# Patient Record
Sex: FEMALE | Race: WHITE | NOT HISPANIC OR LATINO
[De-identification: names, ages, dates, MRNs, and addresses within clinical notes are randomized per-mention and may not be internally consistent; named-entity substitution may affect disease eponyms.]

---

## 2018-07-10 ENCOUNTER — FORM ENCOUNTER (OUTPATIENT)
Age: 43
End: 2018-07-10

## 2018-08-02 ENCOUNTER — FORM ENCOUNTER (OUTPATIENT)
Age: 43
End: 2018-08-02

## 2019-03-10 ENCOUNTER — FORM ENCOUNTER (OUTPATIENT)
Age: 44
End: 2019-03-10

## 2019-03-21 ENCOUNTER — FORM ENCOUNTER (OUTPATIENT)
Age: 44
End: 2019-03-21

## 2019-10-24 ENCOUNTER — FORM ENCOUNTER (OUTPATIENT)
Age: 44
End: 2019-10-24

## 2019-11-12 ENCOUNTER — FORM ENCOUNTER (OUTPATIENT)
Age: 44
End: 2019-11-12

## 2020-12-01 ENCOUNTER — FORM ENCOUNTER (OUTPATIENT)
Age: 45
End: 2020-12-01

## 2021-06-02 ENCOUNTER — NON-APPOINTMENT (OUTPATIENT)
Age: 46
End: 2021-06-02

## 2021-06-02 ENCOUNTER — APPOINTMENT (OUTPATIENT)
Dept: BREAST CENTER | Facility: CLINIC | Age: 46
End: 2021-06-02
Payer: COMMERCIAL

## 2021-06-02 VITALS
SYSTOLIC BLOOD PRESSURE: 116 MMHG | WEIGHT: 136 LBS | DIASTOLIC BLOOD PRESSURE: 74 MMHG | BODY MASS INDEX: 21.86 KG/M2 | HEIGHT: 66 IN

## 2021-06-02 PROCEDURE — 99213 OFFICE O/P EST LOW 20 MIN: CPT

## 2021-06-02 RX ORDER — LEVOTHYROXINE SODIUM 100 UG/1
100 TABLET ORAL
Refills: 0 | Status: ACTIVE | COMMUNITY

## 2021-12-14 PROBLEM — Z12.39 BREAST CANCER SCREENING: Status: ACTIVE | Noted: 2021-05-28

## 2021-12-16 ENCOUNTER — TRANSCRIPTION ENCOUNTER (OUTPATIENT)
Age: 46
End: 2021-12-16

## 2021-12-16 ENCOUNTER — APPOINTMENT (OUTPATIENT)
Dept: BREAST CENTER | Facility: CLINIC | Age: 46
End: 2021-12-16
Payer: COMMERCIAL

## 2021-12-16 VITALS
DIASTOLIC BLOOD PRESSURE: 80 MMHG | BODY MASS INDEX: 21.69 KG/M2 | WEIGHT: 135 LBS | HEART RATE: 62 BPM | SYSTOLIC BLOOD PRESSURE: 138 MMHG | HEIGHT: 66 IN

## 2021-12-16 DIAGNOSIS — Z12.39 ENCOUNTER FOR OTHER SCREENING FOR MALIGNANT NEOPLASM OF BREAST: ICD-10-CM

## 2021-12-16 DIAGNOSIS — R92.8 OTHER ABNORMAL AND INCONCLUSIVE FINDINGS ON DIAGNOSTIC IMAGING OF BREAST: ICD-10-CM

## 2021-12-16 PROCEDURE — 99214 OFFICE O/P EST MOD 30 MIN: CPT

## 2022-05-11 ENCOUNTER — NON-APPOINTMENT (OUTPATIENT)
Age: 47
End: 2022-05-11

## 2022-07-21 ENCOUNTER — APPOINTMENT (OUTPATIENT)
Dept: BREAST CENTER | Facility: CLINIC | Age: 47
End: 2022-07-21

## 2022-07-21 VITALS
SYSTOLIC BLOOD PRESSURE: 120 MMHG | HEART RATE: 65 BPM | WEIGHT: 136 LBS | DIASTOLIC BLOOD PRESSURE: 78 MMHG | HEIGHT: 65 IN | BODY MASS INDEX: 22.66 KG/M2

## 2022-07-21 DIAGNOSIS — I00 RHEUMATIC FEVER W/OUT HEART INVOLVEMENT: ICD-10-CM

## 2022-07-21 DIAGNOSIS — Z78.9 OTHER SPECIFIED HEALTH STATUS: ICD-10-CM

## 2022-07-21 DIAGNOSIS — Z85.850 PERSONAL HISTORY OF MALIGNANT NEOPLASM OF THYROID: ICD-10-CM

## 2022-07-21 DIAGNOSIS — Z87.891 PERSONAL HISTORY OF NICOTINE DEPENDENCE: ICD-10-CM

## 2022-07-21 PROCEDURE — 99213 OFFICE O/P EST LOW 20 MIN: CPT

## 2023-07-26 ENCOUNTER — NON-APPOINTMENT (OUTPATIENT)
Age: 48
End: 2023-07-26

## 2023-07-26 ENCOUNTER — APPOINTMENT (OUTPATIENT)
Dept: BREAST CENTER | Facility: CLINIC | Age: 48
End: 2023-07-26
Payer: COMMERCIAL

## 2023-07-26 VITALS
WEIGHT: 155 LBS | DIASTOLIC BLOOD PRESSURE: 71 MMHG | BODY MASS INDEX: 25.83 KG/M2 | HEART RATE: 73 BPM | SYSTOLIC BLOOD PRESSURE: 107 MMHG | HEIGHT: 65 IN

## 2023-07-26 DIAGNOSIS — Z12.39 ENCOUNTER FOR OTHER SCREENING FOR MALIGNANT NEOPLASM OF BREAST: ICD-10-CM

## 2023-07-26 DIAGNOSIS — Z78.9 OTHER SPECIFIED HEALTH STATUS: ICD-10-CM

## 2023-07-26 DIAGNOSIS — Z80.3 FAMILY HISTORY OF MALIGNANT NEOPLASM OF BREAST: ICD-10-CM

## 2023-07-26 DIAGNOSIS — Z00.00 ENCOUNTER FOR GENERAL ADULT MEDICAL EXAMINATION W/OUT ABNORMAL FINDINGS: ICD-10-CM

## 2023-07-26 DIAGNOSIS — Z80.41 FAMILY HISTORY OF MALIGNANT NEOPLASM OF OVARY: ICD-10-CM

## 2023-07-26 PROCEDURE — 99214 OFFICE O/P EST MOD 30 MIN: CPT

## 2023-07-26 RX ORDER — METHOTREXATE 2.5 MG/1
TABLET ORAL
Refills: 0 | Status: ACTIVE | COMMUNITY

## 2023-07-26 NOTE — HISTORY OF PRESENT ILLNESS
[FreeTextEntry1] : Patient is a 48 yo F here for breast cancer screening. Patient with history of thyroid cancer.  She has fhx of breast cancer in maternal 2nd cousin (age late 40s) and ovarian cancer in maternal great aunt. Patient is BRCA negative with VUS in CHEK2 (tested 2018).  Patient with history of Lyme disease. Patient with history of thyroidectomy for cancer in 1999 w/ recurrence 4/2022 and is s/p RT. Patient denies palpable masses, skin changes, or nipple discharge bilaterally.\par \par ALE Lifetime Risk- 16.5%\par \par 7/11/18: L US- simple cyst\par 3/22/19: B/L MG & US- dense, multiple cysts\par 10/25/19: MRI- no evidence of disease\par 11/2/20: B/L MG & US- yoana.\par 6/2/21: MRI- stable enhancement - birad 2\par 12/16/21: B/L MG & US- bilateral enlarged axillary lymph nodes. Rec 3m f/u US\par 5/11/22: B/l US- R stable 0.8cm LN 10:00 10FN. L stable 1.2cm cystic change 12:00 2FN. L 1cm cyst 2:00 8FN. L 0.5cm cyst 3:00 3FN. Rec annual imaging. BI-RADS 3\par 7/21/22: MRI- no evidence of malignancy. BI-RADS 2\par 1/10/23: B/l MG/US- heterogeneously dense, b/l benign appearing cysts, YOANA. BIRADS 2.\par 7/26/23: MRI- heterogeneously dense. YOANA. BIRADS 2.

## 2023-07-26 NOTE — PHYSICAL EXAM
[de-identified] : Bilateral breast/axilla/supraclavicular area: No masses, discharge, or adenopathy

## 2023-07-26 NOTE — PAST MEDICAL HISTORY
[Menstruating] : The patient is menstruating [Definite ___ (Date)] : the last menstrual period was [unfilled] [History of Hormone Replacement Treatment] : has no history of hormone replacement treatment [FreeTextEntry5] : no [FreeTextEntry6] : no [FreeTextEntry7] : Yes: Oral contraception and patch in the past  [FreeTextEntry8] : yes

## 2024-07-30 ENCOUNTER — NON-APPOINTMENT (OUTPATIENT)
Age: 49
End: 2024-07-30

## 2024-07-30 NOTE — PLAN
[TextEntry] : Reviewed imaging findings and discussed results with patient. She is to return in 1 year for annual B/l MG & US and office visit.  If benign, MRI to be done 6 months following.

## 2024-07-30 NOTE — PAST MEDICAL HISTORY
[FreeTextEntry1] : pt complains of a large black and blue on her R breast. [Menstruating] : The patient is menstruating [TextBox_17] : right breast hematoma-no trauma [Menarche Age ____] : age at menarche was [unfilled] [Definite ___ (Date)] : the last menstrual period was [unfilled] [Regular Cycle Intervals] : have been regular [Total Preg ___] : G[unfilled] [Live Births ___] : P[unfilled]  [Abortions ___] : Abortions:[unfilled] [Age At Live Birth ___] : Age at live birth: [unfilled] [History of Hormone Replacement Treatment] : has no history of hormone replacement treatment [FreeTextEntry6] : no [FreeTextEntry5] : no [FreeTextEntry7] : Yes: Oral contraception and patch in the past  [FreeTextEntry8] : yes

## 2024-07-30 NOTE — HISTORY OF PRESENT ILLNESS
[FreeTextEntry1] : Patient is a 48 yo F here for breast cancer screening. Patient with history of thyroid cancer.  She has fhx of breast cancer in maternal 2nd cousin (age late 40s) and ovarian cancer in maternal great aunt. Patient is BRCA negative with VUS in CHEK2 (tested 2018).  Patient with history of Lyme disease. Patient with history of thyroidectomy for cancer in 1999 w/ recurrence 4/2022 and is s/p RT. Patient denies palpable masses, skin changes, or nipple discharge bilaterally.  ALE Lifetime Risk- 16.5%  7/11/18: L US- simple cyst 3/22/19: B/L MG & US- dense, multiple cysts 10/25/19: MRI- no evidence of disease 11/2/20: B/L MG & US- yoana. 6/2/21: MRI- stable enhancement - birad 2 12/16/21: B/L MG & US- bilateral enlarged axillary lymph nodes. Rec 3m f/u US 5/11/22: B/l US- R stable 0.8cm LN 10:00 10FN. L stable 1.2cm cystic change 12:00 2FN. L 1cm cyst 2:00 8FN. L 0.5cm cyst 3:00 3FN. Rec annual imaging. BI-RADS 3 7/21/22: MRI- no evidence of malignancy. BI-RADS 2 1/10/23: B/l MG/US- heterogeneously dense, b/l benign appearing cysts, YOANA. BIRADS 2. 7/26/23: MRI- heterogeneously dense. YOANA. BIRADS 2. 8/12/24: B/L MG/US: to be added on  8/12/24 MRI: scheduled

## 2024-07-30 NOTE — PHYSICAL EXAM
[Normocephalic] : normocephalic [EOMI] : extra ocular movement intact [Supple] : supple [No Supraclavicular Adenopathy] : no supraclavicular adenopathy [No Cervical Adenopathy] : no cervical adenopathy [de-identified] : Bilateral breast/axilla/supraclavicular area: No masses, discharge, or adenopathy

## 2025-01-16 ENCOUNTER — APPOINTMENT (OUTPATIENT)
Dept: BREAST CENTER | Facility: CLINIC | Age: 50
End: 2025-01-16
Payer: COMMERCIAL

## 2025-01-16 VITALS
SYSTOLIC BLOOD PRESSURE: 120 MMHG | BODY MASS INDEX: 22.66 KG/M2 | HEIGHT: 66 IN | DIASTOLIC BLOOD PRESSURE: 76 MMHG | WEIGHT: 141 LBS | OXYGEN SATURATION: 98 % | HEART RATE: 65 BPM

## 2025-01-16 DIAGNOSIS — Z12.39 ENCOUNTER FOR OTHER SCREENING FOR MALIGNANT NEOPLASM OF BREAST: ICD-10-CM

## 2025-01-16 PROCEDURE — 99214 OFFICE O/P EST MOD 30 MIN: CPT

## 2025-01-16 RX ORDER — PREDNISONE 5 MG/1
5 TABLET ORAL
Refills: 0 | Status: ACTIVE | COMMUNITY

## 2025-01-16 RX ORDER — LEFLUNOMIDE 10 MG/1
10 TABLET, FILM COATED ORAL
Refills: 0 | Status: ACTIVE | COMMUNITY

## 2025-06-30 ENCOUNTER — NON-APPOINTMENT (OUTPATIENT)
Age: 50
End: 2025-06-30

## 2025-07-15 ENCOUNTER — NON-APPOINTMENT (OUTPATIENT)
Age: 50
End: 2025-07-15